# Patient Record
Sex: FEMALE | Race: WHITE | URBAN - METROPOLITAN AREA
[De-identification: names, ages, dates, MRNs, and addresses within clinical notes are randomized per-mention and may not be internally consistent; named-entity substitution may affect disease eponyms.]

---

## 2020-12-01 ENCOUNTER — IMPORTED ENCOUNTER (OUTPATIENT)
Dept: URBAN - METROPOLITAN AREA CLINIC 9 | Facility: CLINIC | Age: 80
End: 2020-12-01

## 2021-01-14 ENCOUNTER — IMPORTED ENCOUNTER (OUTPATIENT)
Dept: URBAN - METROPOLITAN AREA CLINIC 9 | Facility: CLINIC | Age: 81
End: 2021-01-14

## 2021-04-12 NOTE — PATIENT DISCUSSION
Recommended artificial tears and warm compresses. Continue Maxitrol chris Qhs OU for 1 more week. Consider Doxy if patient's gastro. doctor OK's it.

## 2021-10-16 ASSESSMENT — VISUAL ACUITY
OS_CC: 20/30 SN
OD_CC: 20/40 + SN
OS_SC: 20/100 SN
OD_CC: 20/25 - SN
OD_CC: 20/25 - SN
OS_CC: 20/40 - SN
OS_PH: 20/30 SN

## 2021-10-16 ASSESSMENT — TONOMETRY
OS_IOP_MMHG: 20
OD_IOP_MMHG: 17
OD_IOP_MMHG: 15
OS_IOP_MMHG: 25

## 2022-04-20 ENCOUNTER — ESTABLISHED PATIENT (OUTPATIENT)
Dept: URBAN - METROPOLITAN AREA CLINIC 10 | Facility: CLINIC | Age: 82
End: 2022-04-20

## 2022-04-20 DIAGNOSIS — H43.313: ICD-10-CM

## 2022-04-20 DIAGNOSIS — H50.21: ICD-10-CM

## 2022-04-20 DIAGNOSIS — Z96.1: ICD-10-CM

## 2022-04-20 PROCEDURE — 92250 FUNDUS PHOTOGRAPHY W/I&R: CPT

## 2022-04-20 PROCEDURE — 92015 DETERMINE REFRACTIVE STATE: CPT

## 2022-04-20 PROCEDURE — 92014 COMPRE OPH EXAM EST PT 1/>: CPT

## 2022-04-20 ASSESSMENT — KERATOMETRY
OD_AXISANGLE_DEGREES: 80
OD_K1POWER_DIOPTERS: 45.75
OD_K2POWER_DIOPTERS: 47.25
OD_AXISANGLE2_DEGREES: 170
OS_K2POWER_DIOPTERS: 47.25
OS_K1POWER_DIOPTERS: 45.25
OS_AXISANGLE_DEGREES: 95
OS_AXISANGLE2_DEGREES: 5

## 2022-04-20 ASSESSMENT — TONOMETRY
OS_IOP_MMHG: 14
OD_IOP_MMHG: 15

## 2022-04-20 ASSESSMENT — VISUAL ACUITY
OD_CC: 20/70
OS_CC: 20/70

## 2023-04-26 ENCOUNTER — ESTABLISHED PATIENT (OUTPATIENT)
Dept: URBAN - METROPOLITAN AREA CLINIC 10 | Facility: CLINIC | Age: 83
End: 2023-04-26

## 2023-04-26 DIAGNOSIS — H43.313: ICD-10-CM

## 2023-04-26 DIAGNOSIS — H50.21: ICD-10-CM

## 2023-04-26 DIAGNOSIS — Z96.1: ICD-10-CM

## 2023-04-26 PROCEDURE — 92015 DETERMINE REFRACTIVE STATE: CPT

## 2023-04-26 PROCEDURE — 92014 COMPRE OPH EXAM EST PT 1/>: CPT

## 2023-04-26 PROCEDURE — 92250 FUNDUS PHOTOGRAPHY W/I&R: CPT

## 2023-04-26 ASSESSMENT — TONOMETRY
OD_IOP_MMHG: 16
OS_IOP_MMHG: 17

## 2023-04-26 ASSESSMENT — KERATOMETRY
OS_AXISANGLE2_DEGREES: 10
OD_AXISANGLE2_DEGREES: 165
OS_K2POWER_DIOPTERS: 47.25
OS_K1POWER_DIOPTERS: 45.00
OD_K1POWER_DIOPTERS: 45.75
OS_AXISANGLE_DEGREES: 100
OD_K2POWER_DIOPTERS: 47.50
OD_AXISANGLE_DEGREES: 75

## 2023-04-26 ASSESSMENT — VISUAL ACUITY
OU_CC: 20/20
OS_CC: 20/20-1
OD_CC: 20/20

## 2024-11-06 ENCOUNTER — COMPREHENSIVE EXAM (OUTPATIENT)
Dept: URBAN - METROPOLITAN AREA CLINIC 10 | Facility: CLINIC | Age: 84
End: 2024-11-06

## 2024-11-06 DIAGNOSIS — H43.313: ICD-10-CM

## 2024-11-06 DIAGNOSIS — H50.21: ICD-10-CM

## 2024-11-06 DIAGNOSIS — Z96.1: ICD-10-CM

## 2024-11-06 DIAGNOSIS — H50.22: ICD-10-CM

## 2024-11-06 PROCEDURE — 92015 DETERMINE REFRACTIVE STATE: CPT

## 2024-11-06 PROCEDURE — 92250 FUNDUS PHOTOGRAPHY W/I&R: CPT

## 2024-11-06 PROCEDURE — 92014 COMPRE OPH EXAM EST PT 1/>: CPT
